# Patient Record
Sex: MALE | Race: BLACK OR AFRICAN AMERICAN | Employment: OTHER | URBAN - METROPOLITAN AREA
[De-identification: names, ages, dates, MRNs, and addresses within clinical notes are randomized per-mention and may not be internally consistent; named-entity substitution may affect disease eponyms.]

---

## 2017-10-26 ENCOUNTER — HOSPITAL ENCOUNTER (EMERGENCY)
Facility: CLINIC | Age: 32
Discharge: HOME | End: 2017-10-26
Attending: EMERGENCY MEDICINE | Admitting: EMERGENCY MEDICINE
Payer: COMMERCIAL

## 2017-10-26 VITALS
BODY MASS INDEX: 25.48 KG/M2 | WEIGHT: 178 LBS | SYSTOLIC BLOOD PRESSURE: 133 MMHG | DIASTOLIC BLOOD PRESSURE: 93 MMHG | TEMPERATURE: 97.8 F | HEART RATE: 83 BPM | OXYGEN SATURATION: 100 % | HEIGHT: 70 IN

## 2017-10-26 DIAGNOSIS — R42 LIGHTHEADEDNESS: ICD-10-CM

## 2017-10-26 DIAGNOSIS — D75.1 POLYCYTHEMIA: ICD-10-CM

## 2017-10-26 LAB
ANION GAP SERPL CALCULATED.3IONS-SCNC: 6 MMOL/L (ref 3–14)
BASOPHILS # BLD AUTO: 0 10E9/L (ref 0–0.2)
BASOPHILS NFR BLD AUTO: 0 %
BUN SERPL-MCNC: 11 MG/DL (ref 7–30)
CALCIUM SERPL-MCNC: 9 MG/DL (ref 8.5–10.1)
CHLORIDE SERPL-SCNC: 104 MMOL/L (ref 94–109)
CO2 SERPL-SCNC: 29 MMOL/L (ref 20–32)
CREAT SERPL-MCNC: 1.06 MG/DL (ref 0.66–1.25)
DIFFERENTIAL METHOD BLD: ABNORMAL
EOSINOPHIL # BLD AUTO: 0 10E9/L (ref 0–0.7)
EOSINOPHIL NFR BLD AUTO: 0.4 %
ERYTHROCYTE [DISTWIDTH] IN BLOOD BY AUTOMATED COUNT: 11.7 % (ref 10–15)
GFR SERPL CREATININE-BSD FRML MDRD: 80 ML/MIN/1.7M2
GLUCOSE SERPL-MCNC: 92 MG/DL (ref 70–99)
HCT VFR BLD AUTO: 48.4 % (ref 40–53)
HGB BLD-MCNC: 18.2 G/DL (ref 13.3–17.7)
IMM GRANULOCYTES # BLD: 0 10E9/L (ref 0–0.4)
IMM GRANULOCYTES NFR BLD: 0.4 %
INTERPRETATION ECG - MUSE: NORMAL
LYMPHOCYTES # BLD AUTO: 1.2 10E9/L (ref 0.8–5.3)
LYMPHOCYTES NFR BLD AUTO: 22.5 %
MCH RBC QN AUTO: 32.9 PG (ref 26.5–33)
MCHC RBC AUTO-ENTMCNC: 37.6 G/DL (ref 31.5–36.5)
MCV RBC AUTO: 87 FL (ref 78–100)
MONOCYTES # BLD AUTO: 0.4 10E9/L (ref 0–1.3)
MONOCYTES NFR BLD AUTO: 7.3 %
NEUTROPHILS # BLD AUTO: 3.7 10E9/L (ref 1.6–8.3)
NEUTROPHILS NFR BLD AUTO: 69.4 %
NRBC # BLD AUTO: 0 10*3/UL
NRBC BLD AUTO-RTO: 0 /100
PLATELET # BLD AUTO: 200 10E9/L (ref 150–450)
POTASSIUM SERPL-SCNC: 4.1 MMOL/L (ref 3.4–5.3)
RBC # BLD AUTO: 5.54 10E12/L (ref 4.4–5.9)
SODIUM SERPL-SCNC: 139 MMOL/L (ref 133–144)
TROPONIN I SERPL-MCNC: <0.015 UG/L (ref 0–0.04)
WBC # BLD AUTO: 5.3 10E9/L (ref 4–11)

## 2017-10-26 PROCEDURE — 85025 COMPLETE CBC W/AUTO DIFF WBC: CPT | Performed by: EMERGENCY MEDICINE

## 2017-10-26 PROCEDURE — 99284 EMERGENCY DEPT VISIT MOD MDM: CPT

## 2017-10-26 PROCEDURE — 80048 BASIC METABOLIC PNL TOTAL CA: CPT | Performed by: EMERGENCY MEDICINE

## 2017-10-26 PROCEDURE — 93005 ELECTROCARDIOGRAM TRACING: CPT

## 2017-10-26 PROCEDURE — 84484 ASSAY OF TROPONIN QUANT: CPT | Performed by: EMERGENCY MEDICINE

## 2017-10-26 NOTE — ED PROVIDER NOTES
"  History     Chief Complaint:  Dizziness       HPI   Jl Isaac is a 32 year old male who presents to the emergency department today for evaluation of lightheadedness. The patient reports he was walking at the [employer] last night and all of the lights there made him dizzy and lightheaded while walking. He states this sensation of lightheadedness has persisted and is still present today. The patient also reports an odd sensation in his chest with this for the past week, with increased belching. Of note, the patient's wife is a family medicine doctor who told the patient his symptoms were not worrisome. He denies any pain or fevers.  No headache, no lateralizing weakness or numbness, no history of stroke or heart problems. No history of DVT or PE.      Of note, the patient states he has a one month old at home and that he has been only sleeping around 4 hours a night lately.       Allergies:  No Known Drug Allergies      Medications:    The patient is currently on no regular medications.     Past Medical History:    History reviewed. No pertinent past medical history.    Past Surgical History:    History reviewed. No pertinent surgical history.    Family History:    History reviewed. No pertinent family history.      Social History:  The patient was accompanied to the ED by himself.  Smoking Status: Never smoker   Smokeless Tobacco: No   Alcohol Use: No    Marital Status:  , wife is family medicine MD   Trained as a , currently not working.    Review of Systems   All other systems reviewed and are negative.    Physical Exam     Patient Vitals for the past 24 hrs:   BP Temp Temp src Pulse Heart Rate Resp SpO2 Height Weight   10/26/17 1554 (!) 133/93 - - - 70 (!) 0 100 % - -   10/26/17 1530 - - - - 68 11 - - -   10/26/17 1357 123/78 97.8  F (36.6  C) Temporal 83 - 18 100 % 1.778 m (5' 10\") 80.7 kg (178 lb)      Physical Exam  General: nontoxic appearing male sitting upright  HENT: " mucous membranes moist, OP clear, TMs wnl  CV: regular rate, regular rhythm, no murmur audible, no LE edema  Resp: clear throughout, normal effort  GI: abdomen soft and nontender, no guarding  MSK: no bony tenderness  Skin: appropriately warm and dry  Neuro: awake, alert, clear speech, fully oriented, face symmetric, CN 2-12 intact,  normal, strength and sensation intact in all extr, no meningismus, ambulatory without ataxia, NIH SS = 0      Psych: calm, cooperative    Emergency Department Course     ECG:  Indication: Lightheadedness   Completed at 1436.  Read at 1444.   Normal sinus rhythm. Nonspecific ST and T wave abnormality.   Rate 76 bpm. OH interval 142. QRS duration 82. QT/QTc 374/420. P-R-T axes 26 46 -2.     Laboratory:  Laboratory findings were communicated with the patient who voiced understanding of the findings.    CBC: WBC 5.3, HGB 18.2 (H),   BMP: WNL. (Creatinine 1.06)   Troponin (Collected 1435): <0.015     Emergency Department Course:  Nursing notes and vitals reviewed.  1430 I performed an exam of the patient as documented above.   1446 Patient's orthostatics reviewed. Supine /85 and HR 75, sitting .103 and HR 83, standing /92 and HR 75.   IV was inserted and blood was drawn for laboratory testing, results above.   1554 I rechecked the patient and updated him on his laboratory and EKG results.   I discussed the treatment plan with the patient. They expressed understanding of this plan and consented to discharge. They will be discharged home with instructions for care and follow up. In addition, the patient will return to the emergency department if their symptoms worsen, if new symptoms arise or if there is any concern.  All questions were answered. I personally reviewed the laboratory results with the Patient and answered all related questions prior to discharge.   Impression & Plan      Medical Decision Making:  Jl Isaac is a 32 year old male who  presents of lightheadedness of unclear cause. He has normal vitals. Orthostasis considered but not supported by his evaluation. He has no lateralizing symptoms to suggest stroke. No vertigo. He walks with a steady gait, observed here. EKG shows no arrhythmia. Electrolytes are reassuring, ruling out hyponatremia or hyper or hypoglycemia. I did discus his minimally elevated hemoglobin with him, which I think is unlikely related to his presenting symptoms. With his multiple days of chest discomfort and a normal cardiopulmonary exam, his single troponin adequately rules out ACS in this young generally healthy male. I highly doubt PE given he is PERC negative, as well as aortic dissection, pneumothorax, or pneumonia. He was welcomed back for acute problems and otherwise can follow up soon through primary care.    Diagnosis:    ICD-10-CM    1. Lightheadedness R42    2. Polycythemia D75.1        Disposition:  Discharged to home.     Scribe Disclosure:  I, Shay Rai, am serving as a scribe at 2:25 PM on 10/26/2017 to document services personally performed by Robert Sierra MD based on my observations and the provider's statements to me.    10/26/2017    EMERGENCY DEPARTMENT       Robert Sierra MD  10/26/17 4222

## 2017-10-26 NOTE — ED AVS SNAPSHOT
Emergency Department    [address] [place] [address]    Phone:  [phone]    Fax:  [phone]                                       Jl Isaac   MRN: [id number]    Department:   Emergency Department   Date of Visit:  10/26/2017           After Visit Summary Signature Page     I have received my discharge instructions, and my questions have been answered. I have discussed any challenges I see with this plan with the nurse or doctor.    ..........................................................................................................................................  Patient/Patient Representative Signature      ..........................................................................................................................................  Patient Representative Print Name and Relationship to Patient    ..................................................               ................................................  Date                                            Time    ..........................................................................................................................................  Reviewed by Signature/Title    ...................................................              ..............................................  Date                                                            Time

## 2017-10-26 NOTE — ED AVS SNAPSHOT
Emergency Department    [address] [facility] [address]    Phone:  [phone]    Fax:  [phone]                                       Jl Isaac   MRN: [id number]    Department:   Emergency Department   Date of Visit:  10/26/2017           Patient Information     Date Of Birth          1985        Your diagnoses for this visit were:     Lightheadedness     Polycythemia        You were seen by Robert Sierra MD.      Follow-up Information     Schedule an appointment as soon as possible for a visit with [facility].    Specialties:  Podiatry, Internal Medicine, Family Medicine    Contact information:    [address] [address] [phone]        Follow up with  Emergency Department.    Specialty:  EMERGENCY MEDICINE    Why:  As needed, If symptoms worsen    Contact information:    [address] [facility] [address] [phone]      Discharge References/Attachments     DIZZINESS, UNCERTAIN CAUSE (ENGLISH)      24 Hour Appointment Hotline       To make an appointment at any [facility], call [phone]). If you don't have a family doctor or clinic, we will help you find one. [facility] are conveniently located to serve the needs of you and your family.             Review of your medicines      Notice     You have not been prescribed any medications.            Procedures and tests performed during your visit     Basic metabolic panel    CBC with platelets differential    Cardiac Continuous Monitoring    EKG 12-lead, tracing only    Orthostatic blood pressure and pulse    Peripheral IV catheter    Troponin I      Orders Needing Specimen Collection     None      Pending Results     No orders found from 10/24/2017 to 10/27/2017.            Pending Culture Results     No orders found from 10/24/2017 to 10/27/2017.            Pending Results Instructions     If you had any  lab results that were not finalized at the time of your Discharge, you can call the ED Lab Result RN at [phone]. You will be contacted by this team for any positive Lab results or changes in treatment. The nurses are available 7 days a week from 10A to 6:30P.  You can leave a message 24 hours per day and they will return your call.        Test Results From Your Hospital Stay        10/26/2017  3:05 PM      Component Results     Component Value Ref Range & Units Status    Sodium 139 133 - 144 mmol/L Final    Potassium 4.1 3.4 - 5.3 mmol/L Final    Chloride 104 94 - 109 mmol/L Final    Carbon Dioxide 29 20 - 32 mmol/L Final    Anion Gap 6 3 - 14 mmol/L Final    Glucose 92 70 - 99 mg/dL Final    Urea Nitrogen 11 7 - 30 mg/dL Final    Creatinine 1.06 0.66 - 1.25 mg/dL Final    GFR Estimate 80 >60 mL/min/1.7m2 Final    Non  GFR Calc    GFR Estimate If Black >90 >60 mL/min/1.7m2 Final    African American GFR Calc    Calcium 9.0 8.5 - 10.1 mg/dL Final         10/26/2017  3:27 PM      Component Results     Component Value Ref Range & Units Status    WBC 5.3 4.0 - 11.0 10e9/L Final    RBC Count 5.54 4.4 - 5.9 10e12/L Final    Hemoglobin 18.2 (H) 13.3 - 17.7 g/dL Final    Hematocrit 48.4 40.0 - 53.0 % Final    MCV 87 78 - 100 fl Final    MCH 32.9 26.5 - 33.0 pg Final    MCHC 37.6 (H) 31.5 - 36.5 g/dL Final    37 C results, possible cold agglutinins    RDW 11.7 10.0 - 15.0 % Final    Platelet Count 200 150 - 450 10e9/L Final    Diff Method Automated Method  Final    % Neutrophils 69.4 % Final    % Lymphocytes 22.5 % Final    % Monocytes 7.3 % Final    % Eosinophils 0.4 % Final    % Basophils 0.0 % Final    % Immature Granulocytes 0.4 % Final    Nucleated RBCs 0 0 /100 Final    Absolute Neutrophil 3.7 1.6 - 8.3 10e9/L Final    Absolute Lymphocytes 1.2 0.8 - 5.3 10e9/L Final    Absolute Monocytes 0.4 0.0 - 1.3 10e9/L Final    Absolute Eosinophils 0.0 0.0 - 0.7 10e9/L Final    Absolute Basophils 0.0 0.0 - 0.2  10e9/L Final    Abs Immature Granulocytes 0.0 0 - 0.4 10e9/L Final    Absolute Nucleated RBC 0.0  Final         10/26/2017  3:07 PM      Component Results     Component Value Ref Range & Units Status    Troponin I ES <0.015 0.000 - 0.045 ug/L Final    The 99th percentile for upper reference range is 0.045 ug/L.  Troponin values   in the range of 0.045 - 0.120 ug/L may be associated with risks of adverse   clinical events.                  Clinical Quality Measure: Blood Pressure Screening     Your blood pressure was checked while you were in the emergency department today. The last reading we obtained was  BP: 123/78 . Please read the guidelines below about what these numbers mean and what you should do about them.  If your systolic blood pressure (the top number) is less than 120 and your diastolic blood pressure (the bottom number) is less than 80, then your blood pressure is normal. There is nothing more that you need to do about it.  If your systolic blood pressure (the top number) is 120-139 or your diastolic blood pressure (the bottom number) is 80-89, your blood pressure may be higher than it should be. You should have your blood pressure rechecked within a year by a primary care provider.  If your systolic blood pressure (the top number) is 140 or greater or your diastolic blood pressure (the bottom number) is 90 or greater, you may have high blood pressure. High blood pressure is treatable, but if left untreated over time it can put you at risk for heart attack, stroke, or kidney failure. You should have your blood pressure rechecked by a primary care provider within the next 4 weeks.  If your provider in the emergency department today gave you specific instructions to follow-up with your doctor or provider even sooner than that, you should follow that instruction and not wait for up to 4 weeks for your follow-up visit.        Thank you for choosing [facility]       Thank you for choosing [facility] for your  "care. Our goal is always to provide you with excellent care. Hearing back from our patients is one way we can continue to improve our services. Please take a few minutes to complete the written survey that you may receive in the mail after you visit with us. Thank you!        [employer] Information     [employer] lets you send messages to your doctor, view your test results, renew your prescriptions, schedule appointments and more. To sign up, go to [email or web address] . Click on \"Log in\" on the left side of the screen, which will take you to the Welcome page. Then click on \"Sign up Now\" on the right side of the page.     You will be asked to enter the access code listed below, as well as some personal information. Please follow the directions to create your username and password.     Your access code is: [email or web address]  Expires: 2018  4:22 PM     Your access code will  in 90 days. If you need help or a new code, please call your [facility] clinic or [phone].        Care EveryWhere ID     This is your Care EveryWhere ID. This could be used by other organizations to access your [facility] medical records  [id number]        Equal Access to Services     [facility]: Issa Anderson, ellen bailon, qagreg kawilly vallejo, cynthia pradhan. So [facility] [phone].    ATENCIÓN: Si habla español, tiene a jackson disposición servicios gratuitos de asistencia lingüística. Alame al [phone].    We comply with applicable federal civil rights laws and [place] laws. We do not discriminate on the basis of race, color, national origin, age, disability, sex, sexual orientation, or gender identity.            After Visit Summary       This is your record. Keep this with you and show to your community pharmacist(s) and doctor(s) at your next visit.                  "

## 2017-11-25 ENCOUNTER — HOSPITAL ENCOUNTER (EMERGENCY)
Facility: CLINIC | Age: 32
Discharge: HOME | End: 2017-11-25
Attending: EMERGENCY MEDICINE | Admitting: EMERGENCY MEDICINE
Payer: COMMERCIAL

## 2017-11-25 VITALS
DIASTOLIC BLOOD PRESSURE: 92 MMHG | WEIGHT: 180 LBS | HEIGHT: 70 IN | RESPIRATION RATE: 18 BRPM | TEMPERATURE: 97 F | SYSTOLIC BLOOD PRESSURE: 129 MMHG | BODY MASS INDEX: 25.77 KG/M2 | OXYGEN SATURATION: 100 %

## 2017-11-25 DIAGNOSIS — R00.0 SINUS TACHYCARDIA: ICD-10-CM

## 2017-11-25 DIAGNOSIS — F17.290 HOOKAH PIPE SMOKER: ICD-10-CM

## 2017-11-25 PROCEDURE — 99283 EMERGENCY DEPT VISIT LOW MDM: CPT

## 2017-11-25 PROCEDURE — 93005 ELECTROCARDIOGRAM TRACING: CPT

## 2017-11-25 ASSESSMENT — ENCOUNTER SYMPTOMS
CHILLS: 0
FEVER: 0
APPETITE CHANGE: 0
PALPITATIONS: 1
COUGH: 0
NUMBNESS: 0
NERVOUS/ANXIOUS: 1

## 2017-11-25 NOTE — ED AVS SNAPSHOT
Emergency Department    [address] [place] [address]    Phone:  [phone]    Fax:  [phone]                                       Jl Isaac   MRN: [id number]    Department:   Emergency Department   Date of Visit:  11/25/2017           After Visit Summary Signature Page     I have received my discharge instructions, and my questions have been answered. I have discussed any challenges I see with this plan with the nurse or doctor.    ..........................................................................................................................................  Patient/Patient Representative Signature      ..........................................................................................................................................  Patient Representative Print Name and Relationship to Patient    ..................................................               ................................................  Date                                            Time    ..........................................................................................................................................  Reviewed by Signature/Title    ...................................................              ..............................................  Date                                                            Time

## 2017-11-25 NOTE — ED AVS SNAPSHOT
Emergency Department    [address] [place] [address]    Phone:  [phone]    Fax:  [phone]                                       Jl Isaac   MRN: [id number]    Department:   Emergency Department   Date of Visit:  11/25/2017           Patient Information     Date Of Birth          1985        Your diagnoses for this visit were:     Hookah pipe smoker     Sinus tachycardia secondary to Hookah and caffeine       You were seen by Katerin Ramirez MD.      Follow-up Information     Schedule an appointment as soon as possible for a visit with No Ref-Primary, Physician.    Why:  As needed    Contact information:    NO REF-PRIMARY PHYSICIAN          Discharge Instructions       No more smoking hookah, push fluids, caffeine in moderation. Recheck in the clinic if you have any further problems.        Discharge References/Attachments     CANCER, PREVENTING (ENGLISH)      24 Hour Appointment Hotline       To make an appointment at any [facility], call [phone]). If you don't have a family doctor or clinic, we will help you find one. [facility] are conveniently located to serve the needs of you and your family.             Review of your medicines      Notice     You have not been prescribed any medications.            Procedures and tests performed during your visit     EKG 12 lead      Orders Needing Specimen Collection     None      Pending Results     No orders found from 11/23/2017 to 11/26/2017.            Pending Culture Results     No orders found from 11/23/2017 to 11/26/2017.            Pending Results Instructions     If you had any lab results that were not finalized at the time of your Discharge, you can call the ED Lab Result RN at [phone]. You will be contacted by this team for any positive Lab results or changes in treatment. The nurses are available 7 days a week from 10A to 6:30P.  You can leave a message 24 hours per day  and they will return your call.        Test Results From Your Hospital Stay               Clinical Quality Measure: Blood Pressure Screening     Your blood pressure was checked while you were in the emergency department today. The last reading we obtained was  BP: (!) 129/92 . Please read the guidelines below about what these numbers mean and what you should do about them.  If your systolic blood pressure (the top number) is less than 120 and your diastolic blood pressure (the bottom number) is less than 80, then your blood pressure is normal. There is nothing more that you need to do about it.  If your systolic blood pressure (the top number) is 120-139 or your diastolic blood pressure (the bottom number) is 80-89, your blood pressure may be higher than it should be. You should have your blood pressure rechecked within a year by a primary care provider.  If your systolic blood pressure (the top number) is 140 or greater or your diastolic blood pressure (the bottom number) is 90 or greater, you may have high blood pressure. High blood pressure is treatable, but if left untreated over time it can put you at risk for heart attack, stroke, or kidney failure. You should have your blood pressure rechecked by a primary care provider within the next 4 weeks.  If your provider in the emergency department today gave you specific instructions to follow-up with your doctor or provider even sooner than that, you should follow that instruction and not wait for up to 4 weeks for your follow-up visit.        Thank you for choosing [facility]       Thank you for choosing [facility] for your care. Our goal is always to provide you with excellent care. Hearing back from our patients is one way we can continue to improve our services. Please take a few minutes to complete the written survey that you may receive in the mail after you visit with us. Thank you!        [employer] Information     [employer] lets you send messages to your doctor,  "view your test results, renew your prescriptions, schedule appointments and more. To sign up, go to [email or web address] . Click on \"Log in\" on the left side of the screen, which will take you to the Welcome page. Then click on \"Sign up Now\" on the right side of the page.     You will be asked to enter the access code listed below, as well as some personal information. Please follow the directions to create your username and password.     Your access code is: [email or web address]  Expires: 2018  3:22 PM     Your access code will  in 90 days. If you need help or a new code, please call your [facility] clinic or [phone].        Care EveryWhere ID     This is your Care EveryWhere ID. This could be used by other organizations to access your [facility] medical records  [id number]        Equal Access to Services     [facility] : Hadsergei Anderson, warodney bailon, mayra vallejo, cynthia garza . So [facility] [phone].    ATENCIÓN: Si habla español, tiene a jackson disposición servicios gratuitos de asistencia lingüística. Llame al [phone].    We comply with applicable federal civil rights laws and [place] laws. We do not discriminate on the basis of race, color, national origin, age, disability, sex, sexual orientation, or gender identity.            After Visit Summary       This is your record. Keep this with you and show to your community pharmacist(s) and doctor(s) at your next visit.                  "

## 2017-11-25 NOTE — ED PROVIDER NOTES
History     Chief Complaint:  Palpitations       HPI   Jl Isaac is a 32 year old male who presents to the ED for evaluation of palpitations. The patient reports he was watching a war movie, drinking a coke, and smoking Hookah. The patient notes his palpitations began which then caused anxiety and subsequent increased palpitations. The patient reports he is quitting Hookah after today. The patient denies any fevers, chills, cough, chest pain, leg swelling, numbness, tingling, or appetite change.     CARDIAC RISK FACTORS:  Sex:    Male  Tobacco:   No  Hypertension:   No  Hyperlipidemia:  No  Diabetes:   No  Family History:  No    PE/DVT RISK FACTORS:  Sex:    Male  Hormones:   No  Tobacco:   No  Cancer:   No  Travel:   No  Surgery:   No  Other immobilization: No  Personal history:  No  Family history:  No    Allergies:  No known drug allergies    Medications:    The patient is not currently taking any prescribed medications.    Past Medical History:    The patient does not have any past pertinent medical history.    Past Surgical History:    History reviewed. No pertinent surgical history.    Family History:    History reviewed. No pertinent family history.     Social History:  Smoking status: Never smoker  Substance use: Hookah   Alcohol use: No  Presents to ED alone  Marital Status:   [2]     Review of Systems   Constitutional: Negative for appetite change, chills and fever.   Respiratory: Negative for cough.    Cardiovascular: Positive for palpitations. Negative for chest pain and leg swelling.   Neurological: Negative for numbness.   Psychiatric/Behavioral: The patient is nervous/anxious.    All other systems reviewed and are negative.    Physical Exam     Patient Vitals for the past 24 hrs:   BP Temp Temp src Heart Rate Resp SpO2 Height Weight   11/25/17 1745 - - - 72 18 - - -   11/25/17 1730 - - - 73 22 - - -   11/25/17 1715 - - - 86 19 - - -   11/25/17 1700 - - - 90 14 - - -   11/25/17 1636  "(!) 129/92 97  F (36.1  C) Oral 106 16 100 % 1.778 m (5' 10\") 81.6 kg (180 lb)     Physical Exam  Nursing note and vitals reviewed.  Constitutional:  Appears well-developed and well-nourished, comfortable.   HENT:   Head:   No evidence of facial or scalp trauma.  Nose:    Nose normal.   Mouth/Throat:  Mucosa is moist.  Eyes:    Conjunctivae are normal.      Pupils are equal, round, and reactive to light.      Right eye exhibits no discharge. Left eye exhibits no discharge.      No scleral icterus.   Cardiovascular:  Mild regular tachycardia.      Normal heart sounds and intact distal pulses.       No murmur heard.  Pulmonary/Chest:  Effort normal and breath sounds normal. No respiratory distress.     No wheezes. No rales. No chest wall tenderness. No stridor.   Abdominal:   Soft. No distension and no mass. No tenderness.      No rebound and no guarding. No flank pain.  Musculoskeletal:  Normal range of motion.      No edema and no tenderness.                                       Neck supple, no midline cervical tenderness.   Neurological:   Alert and oriented to person, place, and year.      No cranial nerve deficit.      Exhibits normal muscle tone. Coordination normal.      GCS eye subscore is 4. GCS verbal subscore is 5.      GCS motor subscore is 6.   Skin:    Skin is warm and dry. No rash noted. No diaphoresis.      No erythema. No pallor.   Psychiatric:   Anxious.     Emergency Department Course     ECG (16:34:39):  Rate 107 bpm. MT interval 148. QRS duration 74. QT/QTc 328/437. P-R-T axes 61 56 15. Sinus tachycardia. Possible left atrial enlargement. Borderline ECG. Interpreted at 1655 by Katerin Ramirez MD.    Emergency Department Course:  Past medical records, nursing notes, and vitals reviewed.  1651: I performed an exam of the patient and obtained history, as documented above.    1707: I had a discussion with the patient about the risks of Hookah and need for quitting.     Findings and plan explained " to the Patient. Patient discharged home with instructions regarding supportive care, medications, and reasons to return. The importance of close follow-up was reviewed.     Impression & Plan      Medical Decision Making:  Patient comes in with sinus tachycardia. He admitted to drinking a can of Coke and then smoking hookah which she has been doing off and on. He remembers having tachycardia in the past and anxiety from smoking hookah. He said that he wants to stop and I strongly encouraged this. Hookah also carries a risk for cancer and often causes anxiety and tachycardia. His observed for a period of time in the emergency room given water to drink, and his pulse rate now is normal. He has no risk factors for PE, he has no chest pain. Feel he needs further workup. His EKG is normal other than showing sinus tachycardia.    Diagnosis:    ICD-10-CM    1. 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 Sinus tachycardia R00.0     secondary to Hookah and caffeine     Disposition: Patient discharged to home. No more smoking hookah, push fluids, caffeine in moderation. Recheck in the clinic if you have any further problems.    Rebeca Carrero  11/25/2017    EMERGENCY DEPARTMENT    I, Rebeca Carrero, am serving as a scribe at 4:51 PM on 11/25/2017 to document services personally performed by Katerin Ramirez MD based on my observations and the provider's statements to me.        Katerin Ramirez MD  11/25/17 2546

## 2017-11-26 LAB — INTERPRETATION ECG - MUSE: NORMAL

## 2017-11-26 NOTE — DISCHARGE INSTRUCTIONS
No more smoking hookah, push fluids, caffeine in moderation. Recheck in the clinic if you have any further problems.

## 2019-02-05 ENCOUNTER — ANCILLARY PROCEDURE (OUTPATIENT)
Dept: GENERAL RADIOLOGY | Facility: CLINIC | Age: 34
End: 2019-02-05
Attending: FAMILY MEDICINE
Payer: COMMERCIAL

## 2019-02-05 ENCOUNTER — OFFICE VISIT (OUTPATIENT)
Dept: ORTHOPEDICS | Facility: CLINIC | Age: 34
End: 2019-02-05
Payer: COMMERCIAL

## 2019-02-05 VITALS — DIASTOLIC BLOOD PRESSURE: 80 MMHG | SYSTOLIC BLOOD PRESSURE: 108 MMHG

## 2019-02-05 DIAGNOSIS — M25.531 WRIST PAIN, RIGHT: ICD-10-CM

## 2019-02-05 DIAGNOSIS — S62.024A CLOSED NONDISPLACED FRACTURE OF MIDDLE THIRD OF SCAPHOID BONE OF RIGHT WRIST, INITIAL ENCOUNTER: Primary | ICD-10-CM

## 2019-02-05 PROBLEM — S62.014A: Status: ACTIVE | Noted: 2019-02-05

## 2019-02-05 PROCEDURE — 99204 OFFICE O/P NEW MOD 45 MIN: CPT | Mod: 57 | Performed by: FAMILY MEDICINE

## 2019-02-05 PROCEDURE — 73110 X-RAY EXAM OF WRIST: CPT | Mod: RT | Performed by: FAMILY MEDICINE

## 2019-02-05 PROCEDURE — 25622 CLTX CARPL SCPHD FX W/O MNPJ: CPT | Mod: RT | Performed by: FAMILY MEDICINE

## 2019-02-05 RX ORDER — TRAMADOL HYDROCHLORIDE 50 MG/1
50 TABLET ORAL EVERY 8 HOURS PRN
Qty: 20 TABLET | Refills: 0 | Status: SHIPPED | OUTPATIENT
Start: 2019-02-05 | End: 2019-07-16

## 2019-02-05 NOTE — PROGRESS NOTES
[facility]   Clinic Visit s Feb 5, 2019    PCP: No Ref-Primary, Physician      Jl is a 34 year old male who is seen as self referral for   Chief Complaint   Patient presents with     Right Wrist - Pain       Injury: Patient describes injury as direct blow to flexed wrist from a soccer ball      Right hand dominant    Location of Pain: right wrist radial and ulnar, nonradiating   Duration of Pain: 1 day(s)  Rating of Pain at worst: 7/10  Rating of Pain Currently: 7/10  Pain is better with: activity avoidance   Pain is worse with: carrying (trash, groceries, laundry), cooking and self care  Treatment so far consists of: brace  Associated symptoms: swelling Moderate  Recent imaging completed: No recent imaging completed.  Prior History of related problems: none    Social History: is employed as a/an sales      Past Medical History:   Diagnosis Date     NO ACTIVE PROBLEMS        There are no active problems to display for this patient.      History reviewed. No pertinent family history.    Social History     Socioeconomic History     Marital status:      Spouse name: Not on file     Number of children: Not on file     Years of education: Not on file     Highest education level: Not on file   Social Needs     Financial resource strain: Not on file     Food insecurity - worry: Not on file     Food insecurity - inability: Not on file     Transportation needs - medical: Not on file     Transportation needs - non-medical: Not on file   Occupational History     Not on file   Tobacco Use     Smoking status: Never Smoker   Substance and Sexual Activity     Alcohol use: No     Drug use: No     Sexual activity: Not on file   Other Topics Concern     Not on file   Social History Narrative     Not on file       Past Surgical History:   Procedure Laterality Date     NO HISTORY OF SURGERY             Review of Systems   Musculoskeletal: Positive for joint pain.   All other systems reviewed  and are negative.        Physical Exam  /80   Constitutional:well-developed, well-nourished, and in no distress.   Cardiovascular: Intact distal pulses.    Neurological: alert. Gait Normal:   Gait, station, stance, and balance appear normal for age  Skin: Skin is warm and dry.   Psychiatric: Mood and affect normal.   Respiratory: unlabored, speaks in full sentences  Lymph: no LAD, no lymphangitis            Left Hand Exam     Tenderness   The patient is experiencing tenderness in the snuff box.     Range of Motion   Wrist   Extension: 5   Flexion: 0   Pronation: 0   Supination: 0     Other   Erythema: absent  Scars: absent  Sensation: normal  Pulse: present    Comments:  Prominent swelling about the dorsal radial wrist, no bruising.  No bony deformity apparent.          X-ray images Ordered and independently reviewed by me in the office today with the patient. X-ray shows:     Recent Results (from the past 744 hour(s))   XR Wrist Right G/E 3 Views    Narrative    2/5/2019    Nondisplaced mid waist lucency of scaphoid highly suspicious for fracture.    Remainder of wrist is normal, without other fractures or soft tissue   deformities.         ASSESSMENT/PLAN    ICD-10-CM    1. Closed nondisplaced fracture of middle third of scaphoid bone of right wrist, initial encounter S62.024A traMADol (ULTRAM) 50 MG tablet     Cast/splint application   2. Wrist pain, right M25.531 XR Wrist Right G/E 3 Views     Scaphoid fracture, nondisplaced.  Will start with thumb spica splint and replaced with hard cast after repeat x-ray in a few days.  Discussed options including continued conservative management versus surgical consultation.  At this point we will continue with casting, but if further displacement were to develop, he would need surgical consultation.  Patient comfortable with the plan.  Okay for one-time prescription of tramadol for acute pain.    Cast/splint application  Date/Time: 2/5/2019 3:47 PM  Performed by:  Erickson Mirza MD  Authorized by: Erickson Mirza MD     Consent:     Consent obtained:  Verbal    Consent given by:  Patient    Alternatives discussed:  No treatment  Universal protocol:     Patient identity confirmed:  Verbally with patient  Pre-procedure details:     Sensation:  Normal  Procedure details:     Laterality:  Right    Location:  Wrist    Wrist:  R wrist    Splint type:  Radial gutter    Supplies:  Fiberglass  Post-procedure details:     Sensation:  Normal    Patient tolerance of procedure:  Tolerated well, no immediate complications    Patient provided with cast or splint care instructions: Yes

## 2019-02-05 NOTE — PATIENT INSTRUCTIONS
Patient Education       Navicular Wrist Fracture, Confirmed  You have a break (fracture) in one of the small bones of your wrist. This bone heals slowly. You may need to be in a cast for up to 3 months. Some navicular fractures don t heal the way they should. If so, you may need surgery at a later time.    Home care  Follow these guidelines when caring for yourself at home:    Keep your hand elevated to reduce pain and swelling. When sitting or lying down keep your arm above the level of your heart. You can do this by placing your arm on a pillow that rests on your chest or on a pillow at your side. This is most important during the first 2 days (48 hours) after the injury.    Put an ice pack on the injured area. Do this for 20 minutes every 1 to 2 hours the first day for pain relief. You can make an ice pack by wrapping a plastic bag of ice cubes in a thin towel. As the ice melts, be careful that the cast or splint doesn t get wet. Continue using the ice pack 3 to 4 times a day for the next 2 days. Then use the ice pack as needed to ease pain and swelling.    Keep the cast or splint completely dry at all times. Bathe with your cast or splint out of the water. Protect it with a large plastic bag, rubber-banded at the top end. If a fiberglass cast or splint gets wet, you can dry it with a hair dryer on the cool setting.    You may use acetaminophen or ibuprofen to control pain, unless another pain medicine was prescribed. If you have chronic liver or kidney disease, talk with your healthcare provider before using these medicines. Also talk with your provider if you ve had a stomach ulcer or gastrointestinal bleeding.    If you smoke, try to quit. Tobacco use can keep this fracture from healing the way it should. Smoking raises the risk that you will need surgery for this fracture.  Follow-up care  Follow up with your healthcare provider, or as advised. This is to make sure the bone is healing the way it should. If  a splint was put on, it may be changed to a cast during your follow-up visit. When the cast is removed, you will need to do special hand and wrist exercises. These will help you get back your wrist strength and range of motion. Some people have permanent stiffness in the wrist after this type of injury.  If X-rays were taken, a radiologist may look at them. You will be told of any new findings that may affect your care.  When to seek medical advice  Call your healthcare provider right away if any of these occur:    The cast or splint cracks    The plaster cast or splint becomes wet or soft    The fiberglass cast or splint stays wet for more than 24 hours    Tightness or pain under the cast or splint gets worse    Fingers become swollen, cold, blue, numb, or tingly    Fingers are hard to move    Bad odor from the cast or splint or wound fluid stains the cast or splint    The skin around the cast or splint becomes red, swollen, or irritated    Fever of 101 F (38.3 C) or higher, or as directed by your healthcare provider  Date Last Reviewed: 5/1/2017 2000-2018 [employer]. [address] [place] [address]. All rights reserved. This information is not intended as a substitute for professional medical care. Always follow your healthcare professional's instructions.           Patient Education     Fiberglass Cast Care    It may take up to 2 hours for the fiberglass to get completely hard. Don t put any weight on the cast during that time or it may break.  To prevent swelling under the cast, do the following for the first 2 days (48 hours):    If the cast is on your arm: Keep it in a sling or raised to shoulder level when you are sitting or standing. Rest it on your chest or on a pillow at your side when lying down. Keep the cast above the level of your heart.    If the cast is on your leg: Keep it propped up above the level of your hip when you are sitting or lying down. Sleep with the cast  raised on a pillow. Avoid crutch walking as much as possible during this time.  Keep the cast completely dry at all times. Bathe with your cast well out of the water. Protect it with a large plastic bag kept in place with rubber bands. If your cast does get wet, use a hair dryer to warm the cast and speed up the drying process. A wet cast may cause skin problems.  Don t put creams or objects under the cast if you have itching.  Follow-up care  Follow up with your healthcare provider, or as advised.  When to seek medical advice  Call your healthcare provider right away if any of these occur:    The cast cracks    The cast and padding get wet and stay wet for more than a day (24 hours)    Bad odor from the cast or wound fluid stains the cast    Tightness or pressure under the cast gets worse    Fingers or toes become swollen, cold, blue, numb, or tingly    You can t move your toes or fingers    Pain under the cast gets worse or you feel a burning sensation    Skin around the cast becomes red    Fever of 100.4 F (38 C) or higher, or as directed by your healthcare provider   Date Last Reviewed: 2/1/2017 2000-2018 [employer]. [address] [place] [address], [place] [address]. All rights reserved. This information is not intended as a substitute for professional medical care. Always follow your healthcare professional's instructions.

## 2019-02-05 NOTE — LETTER
2/5/2019         RE: Jl Isaac  [address] [place] [facility] [address]        Dear Colleague,    Thank you for referring your patient, Jl Isaac, to the [facility]. Please see a copy of my visit note below.    HPI   [facility]   Clinic Visit s Feb 5, 2019    PCP: No Ref-Primary, Physician      Jl is a 34 year old male who is seen as self referral for   Chief Complaint   Patient presents with     Right Wrist - Pain       Injury: Patient describes injury as direct blow to flexed wrist from a soccer ball      Right hand dominant    Location of Pain: right wrist radial and ulnar, nonradiating   Duration of Pain: 1 day(s)  Rating of Pain at worst: 7/10  Rating of Pain Currently: 7/10  Pain is better with: activity avoidance   Pain is worse with: carrying (trash, groceries, laundry), cooking and self care  Treatment so far consists of: brace  Associated symptoms: swelling Moderate  Recent imaging completed: No recent imaging completed.  Prior History of related problems: none    Social History: is employed as a/an sales      Past Medical History:   Diagnosis Date     NO ACTIVE PROBLEMS        There are no active problems to display for this patient.      History reviewed. No pertinent family history.    Social History     Socioeconomic History     Marital status:      Spouse name: Not on file     Number of children: Not on file     Years of education: Not on file     Highest education level: Not on file   Social Needs     Financial resource strain: Not on file     Food insecurity - worry: Not on file     Food insecurity - inability: Not on file     Transportation needs - medical: Not on file     Transportation needs - non-medical: Not on file   Occupational History     Not on file   Tobacco Use     Smoking status: Never Smoker   Substance and Sexual Activity     Alcohol use: No     Drug use: No     Sexual activity: Not  on file   Other Topics Concern     Not on file   Social History Narrative     Not on file       Past Surgical History:   Procedure Laterality Date     NO HISTORY OF SURGERY             Review of Systems   Musculoskeletal: Positive for joint pain.   All other systems reviewed and are negative.        Physical Exam  /80   Constitutional:well-developed, well-nourished, and in no distress.   Cardiovascular: Intact distal pulses.    Neurological: alert. Gait Normal:   Gait, station, stance, and balance appear normal for age  Skin: Skin is warm and dry.   Psychiatric: Mood and affect normal.   Respiratory: unlabored, speaks in full sentences  Lymph: no LAD, no lymphangitis            Left Hand Exam     Tenderness   The patient is experiencing tenderness in the snuff box.     Range of Motion   Wrist   Extension: 5   Flexion: 0   Pronation: 0   Supination: 0     Other   Erythema: absent  Scars: absent  Sensation: normal  Pulse: present    Comments:  Prominent swelling about the dorsal radial wrist, no bruising.  No bony deformity apparent.          X-ray images Ordered and independently reviewed by me in the office today with the patient. X-ray shows:     Recent Results (from the past 744 hour(s))   XR Wrist Right G/E 3 Views    Narrative    2/5/2019    Nondisplaced mid waist lucency of scaphoid highly suspicious for fracture.    Remainder of wrist is normal, without other fractures or soft tissue   deformities.         ASSESSMENT/PLAN    ICD-10-CM    1. Closed nondisplaced fracture of middle third of scaphoid bone of right wrist, initial encounter S62.024A traMADol (ULTRAM) 50 MG tablet     Cast/splint application   2. Wrist pain, right M25.531 XR Wrist Right G/E 3 Views     Scaphoid fracture, nondisplaced.  Will start with thumb spica splint and replaced with hard cast after repeat x-ray in a few days.  Discussed options including continued conservative management versus surgical consultation.  At this point we will  continue with casting, but if further displacement were to develop, he would need surgical consultation.  Patient comfortable with the plan.  Okay for one-time prescription of tramadol for acute pain.    Cast/splint application  Date/Time: 2/5/2019 3:47 PM  Performed by: Erickson Mirza MD  Authorized by: Erickson Mirza MD     Consent:     Consent obtained:  Verbal    Consent given by:  Patient    Alternatives discussed:  No treatment  Universal protocol:     Patient identity confirmed:  Verbally with patient  Pre-procedure details:     Sensation:  Normal  Procedure details:     Laterality:  Right    Location:  Wrist    Wrist:  R wrist    Splint type:  Radial gutter    Supplies:  Fiberglass  Post-procedure details:     Sensation:  Normal    Patient tolerance of procedure:  Tolerated well, no immediate complications    Patient provided with cast or splint care instructions: Yes            Again, thank you for allowing me to participate in the care of your patient.        Sincerely,        Erickson Mirza MD

## 2019-02-06 NOTE — PROGRESS NOTES
ANISHA   [facility]   Follow-up Visit s Feb 8, 2019    PCP: No Ref-Primary, Physician      Subjective:  Jl is a 34 year old male who is seen in follow up for evaluation of   Chief Complaint   Patient presents with     Left Knee - Pain     Right Wrist - Pain     His last visit was on 2/5/2019.  Since that time, symptoms have been unchanged. Jl Isaac is accompanied today by self.     Patient has noticed worsened symptoms with splinting treatment.  Patient has marked swelling  Pain is located radial, wrist, constant and persistent.  Patient is using splint.      New left knee  Injury: Patient describes injury as soccer injury. He felt a pop in his knee and hyper extended his knee.      Location of Pain: left knee wrist radial and ulnar, nonradiating   Duration of Pain: 2 years(s)  Rating of Pain at worst: 2/10  Rating of Pain Currently: 0/10  Pain is better with: activity avoidance   Pain is worse with:sitting to standing, squat  Treatment so far consists of: physical therapy   Associated symptoms: atrophy of quad  Recent imaging completed: No recent imaging completed, xrays at [facility] 2017  Prior History of related problems: none    Patient's past medical, surgical, social and family histories are reviewed today.    doi wrist 2/4/19    Social History: is employed as a/an sales      Past Medical History:   Diagnosis Date     NO ACTIVE PROBLEMS        Patient Active Problem List    Diagnosis Date Noted     Closed nondisplaced fracture of middle third of scaphoid bone of right wrist, initial encounter 02/05/2019     Priority: Medium     FMHX denies sig illnesses.      Social History     Socioeconomic History     Marital status:      Spouse name: Not on file     Number of children: Not on file     Years of education: Not on file     Highest education level: Not on file   Social Needs     Financial resource strain: Not on file     Food insecurity - worry: Not on file     Food  "insecurity - inability: Not on file     Transportation needs - medical: Not on file     Transportation needs - non-medical: Not on file   Occupational History     Not on file   Tobacco Use     Smoking status: Never Smoker   Substance and Sexual Activity     Alcohol use: No     Drug use: No       Past Surgical History:   Procedure Laterality Date     NO HISTORY OF SURGERY             Review of Systems   Musculoskeletal: Positive for joint pain.   All other systems reviewed and are negative.        Physical Exam  /80   Ht 1.803 m (5' 11\")   Wt 77.1 kg (170 lb)   BMI 23.71 kg/m    Constitutional:well-developed, well-nourished, and in no distress.   Cardiovascular: Intact distal pulses.    Neurological: alert. Gait Normal:   Gait, station, stance, and balance appear normal for age  Skin: Skin is warm and dry.   Psychiatric: Mood and affect normal.   Respiratory: unlabored, speaks in full sentences  Lymph: no LAD, no lymphangitis            Left Hand Exam     Tenderness   The patient is experiencing tenderness in the snuff box.     Range of Motion   Wrist   Extension: 20   Flexion: 20   Pronation:  0 normal   Supination: 0     Other   Erythema: absent  Scars: absent  Sensation: normal  Pulse: present    Comments:  Resolved swelling about the wrist.  Mild swelling of fingers.  Sensation intact, good circulation.          X-ray images Ordered and independently reviewed by me in the office today with the patient. X-ray shows:     Recent Results (from the past 744 hour(s))   XR Wrist Right G/E 3 Views    Narrative    2/5/2019    Nondisplaced mid waist lucency of scaphoid highly suspicious for fracture.    Remainder of wrist is normal, without other fractures or soft tissue   deformities.         ASSESSMENT/PLAN    ICD-10-CM    1. Quadriceps weakness M62.81 [facility]   2. Patellofemoral pain syndrome of left knee M22.2X2 [facility]   3. Closed nondisplaced " fracture of middle third of scaphoid of right wrist with routine healing, subsequent encounter S62.024D Cast/splint application     Converted to thumb spica short arm cast today, recheck with repeat x-rays in 3-4 weeks.    Left knee pain also evaluated today as above, and pain is most consistent with patellofemoral pain syndrome.  He has had a few therapy visits before but never a dedicated effort.  We discussed MRI but deferred for now.  Unless symptoms change, this is otherwise consistent with patellofemoral syndrome.  MRI would not be necessary.  Physical therapy orders placed.    Cast/splint application  Date/Time: 2/8/2019 3:05 PM  Performed by: Erickson Mirza MD  Authorized by: Erickson Mirza MD     Consent:     Consent obtained:  Verbal    Consent given by:  Patient  Pre-procedure details:     Sensation:  Normal  Procedure details:     Laterality:  Right    Location:  Wrist    Wrist:  R wrist    Cast type:  Thumb spica    Supplies:  Fiberglass  Post-procedure details:     Sensation:  Normal    Patient tolerance of procedure:  Tolerated well, no immediate complications  Comments:      Waterproof padding

## 2019-02-08 ENCOUNTER — OFFICE VISIT (OUTPATIENT)
Dept: ORTHOPEDICS | Facility: CLINIC | Age: 34
End: 2019-02-08
Payer: COMMERCIAL

## 2019-02-08 VITALS
WEIGHT: 170 LBS | SYSTOLIC BLOOD PRESSURE: 110 MMHG | HEIGHT: 71 IN | BODY MASS INDEX: 23.8 KG/M2 | DIASTOLIC BLOOD PRESSURE: 80 MMHG

## 2019-02-08 DIAGNOSIS — M22.2X2 PATELLOFEMORAL PAIN SYNDROME OF LEFT KNEE: ICD-10-CM

## 2019-02-08 DIAGNOSIS — M62.81 QUADRICEPS WEAKNESS: Primary | ICD-10-CM

## 2019-02-08 DIAGNOSIS — S62.024D CLOSED NONDISPLACED FRACTURE OF MIDDLE THIRD OF SCAPHOID OF RIGHT WRIST WITH ROUTINE HEALING, SUBSEQUENT ENCOUNTER: ICD-10-CM

## 2019-02-08 PROCEDURE — 99213 OFFICE O/P EST LOW 20 MIN: CPT | Mod: 24 | Performed by: FAMILY MEDICINE

## 2019-02-08 PROCEDURE — 99207 ZZC FRACTURE CARE IN GLOBAL PERIOD: CPT | Performed by: FAMILY MEDICINE

## 2019-02-08 PROCEDURE — 29075 APPL CST ELBW FNGR SHORT ARM: CPT | Mod: 58 | Performed by: FAMILY MEDICINE

## 2019-02-08 ASSESSMENT — MIFFLIN-ST. JEOR: SCORE: 1728.24

## 2019-02-08 NOTE — LETTER
2/8/2019         RE: Jl Isaac  [address]  [facility] [id number]        Dear Colleague,    Thank you for referring your patient, Jl Isaac, to the [facility]. Please see a copy of my visit note below.    HPI   [facility]   Follow-up Visit s Feb 8, 2019    PCP: No Ref-Primary, Physician      Subjective:  Jl is a 34 year old male who is seen in follow up for evaluation of   Chief Complaint   Patient presents with     Left Knee - Pain     Right Wrist - Pain     His last visit was on 2/5/2019.  Since that time, symptoms have been unchanged. Jl Isaac is accompanied today by self.     Patient has noticed worsened symptoms with splinting treatment.  Patient has marked swelling  Pain is located radial, wrist, constant and persistent.  Patient is using splint.      New left knee  Injury: Patient describes injury as soccer injury. He felt a pop in his knee and hyper extended his knee.      Location of Pain: left knee wrist radial and ulnar, nonradiating   Duration of Pain: 2 years(s)  Rating of Pain at worst: 2/10  Rating of Pain Currently: 0/10  Pain is better with: activity avoidance   Pain is worse with:sitting to standing, squat  Treatment so far consists of: physical therapy   Associated symptoms: atrophy of quad  Recent imaging completed: No recent imaging completed, xrays at [facility] 2017  Prior History of related problems: none    Patient's past medical, surgical, social and family histories are reviewed today.    doi wrist 2/4/19    Social History: is employed as a/an sales      Past Medical History:   Diagnosis Date     NO ACTIVE PROBLEMS        Patient Active Problem List    Diagnosis Date Noted     Closed nondisplaced fracture of middle third of scaphoid bone of right wrist, initial encounter 02/05/2019     Priority: Medium     FMHX denies sig illnesses.      Social History     Socioeconomic History      "Marital status:      Spouse name: Not on file     Number of children: Not on file     Years of education: Not on file     Highest education level: Not on file   Social Needs     Financial resource strain: Not on file     Food insecurity - worry: Not on file     Food insecurity - inability: Not on file     Transportation needs - medical: Not on file     Transportation needs - non-medical: Not on file   Occupational History     Not on file   Tobacco Use     Smoking status: Never Smoker   Substance and Sexual Activity     Alcohol use: No     Drug use: No       Past Surgical History:   Procedure Laterality Date     NO HISTORY OF SURGERY             Review of Systems   Musculoskeletal: Positive for joint pain.   All other systems reviewed and are negative.        Physical Exam  /80   Ht 1.803 m (5' 11\")   Wt 77.1 kg (170 lb)   BMI 23.71 kg/m     Constitutional:well-developed, well-nourished, and in no distress.   Cardiovascular: Intact distal pulses.    Neurological: alert. Gait Normal:   Gait, station, stance, and balance appear normal for age  Skin: Skin is warm and dry.   Psychiatric: Mood and affect normal.   Respiratory: unlabored, speaks in full sentences  Lymph: no LAD, no lymphangitis            Left Hand Exam     Tenderness   The patient is experiencing tenderness in the snuff box.     Range of Motion   Wrist   Extension: 20   Flexion: 20   Pronation:  0 normal   Supination: 0     Other   Erythema: absent  Scars: absent  Sensation: normal  Pulse: present    Comments:  Resolved swelling about the wrist.  Mild swelling of fingers.  Sensation intact, good circulation.          X-ray images Ordered and independently reviewed by me in the office today with the patient. X-ray shows:     Recent Results (from the past 744 hour(s))   XR Wrist Right G/E 3 Views    Narrative    2/5/2019    Nondisplaced mid waist lucency of scaphoid highly suspicious for fracture.    Remainder of wrist is normal, without " other fractures or soft tissue   deformities.         ASSESSMENT/PLAN    ICD-10-CM    1. Quadriceps weakness M62.81 [facility]   2. Patellofemoral pain syndrome of left knee M22.2X2 [facility]   3. Closed nondisplaced fracture of middle third of scaphoid of right wrist with routine healing, subsequent encounter S62.024D Cast/splint application     Converted to thumb spica short arm cast today, recheck with repeat x-rays in 3-4 weeks.    Left knee pain also evaluated today as above, and pain is most consistent with patellofemoral pain syndrome.  He has had a few therapy visits before but never a dedicated effort.  We discussed MRI but deferred for now.  Unless symptoms change, this is otherwise consistent with patellofemoral syndrome.  MRI would not be necessary.  Physical therapy orders placed.    Cast/splint application  Date/Time: 2/8/2019 3:05 PM  Performed by: Erickson Mirza MD  Authorized by: Erickson Mirza MD     Consent:     Consent obtained:  Verbal    Consent given by:  Patient  Pre-procedure details:     Sensation:  Normal  Procedure details:     Laterality:  Right    Location:  Wrist    Wrist:  R wrist    Cast type:  Thumb spica    Supplies:  Fiberglass  Post-procedure details:     Sensation:  Normal    Patient tolerance of procedure:  Tolerated well, no immediate complications  Comments:      Waterproof padding              Again, thank you for allowing me to participate in the care of your patient.        Sincerely,        Erickson Mirza MD

## 2019-02-21 NOTE — PROGRESS NOTES
[facility]   Clinic Visit s Mar 1, 2019    PCP: No Ref-Primary, Physician    Subjective:  Jl Isaac is a 34 year old male who is seen today for follow up of Closed nondisplaced fracture of middle third of scaphoid of right wrist with routine healing, subsequent encounter. Injury occurred on February / 04 / 2019, (4  week(s) ago); his last visit was 2/8/2019.  He has been in a thumb spica cast. Jl Isaac is alone today     Denies new swelling, paresthesias, or weakness.  Has not had any other concerns about the injury.    Patient's past medical, surgical, social and family histories are reviewed today in the medical record.    History from previous visit on 2/8/2019  His last visit was on 2/5/2019.  Since that time, symptoms have been unchanged. Jl Isaac is accompanied today by self.     Patient has noticed worsened symptoms with splinting treatment.  Patient has marked swelling  Pain is located radial, wrist, constant and persistent.  Patient is using splint.        doi wrist 2/4/19    Social History: is employed as a/an sales      Past Medical History:   Diagnosis Date     NO ACTIVE PROBLEMS        Patient Active Problem List    Diagnosis Date Noted     Quadriceps weakness 02/08/2019     Priority: Medium     Patellofemoral pain syndrome of left knee 02/08/2019     Priority: Medium     Closed nondisplaced fracture of middle third of scaphoid bone of right wrist, initial encounter 02/05/2019     Priority: Medium     FMHX denies sig illnesses.      Social History     Socioeconomic History     Marital status:                                                     Occupational History     Not on file   Tobacco Use     Smoking status: Never Smoker   Substance and Sexual Activity     Alcohol use: No     Drug use: No       Past Surgical History:   Procedure Laterality Date     NO HISTORY OF SURGERY             Review of Systems   Musculoskeletal: Positive  "for joint pain.   All other systems reviewed and are negative.        Physical Exam  /80   Ht 1.803 m (5' 11\")   Wt 77.1 kg (170 lb)   BMI 23.71 kg/m    Constitutional:well-developed, well-nourished, and in no distress.   Cardiovascular: Intact distal pulses.    Neurological: alert. Gait Normal:   Gait, station, stance, and balance appear normal for age  Skin: Skin is warm and dry.   Psychiatric: Mood and affect normal.   Respiratory: unlabored, speaks in full sentences  Lymph: no LAD, no lymphangitis            Left Hand Exam     Tenderness   The patient is experiencing tenderness in the snuff box.     Range of Motion   Wrist   Extension: 20   Flexion: 20   Pronation:  0 normal   Supination: 0     Other   Erythema: absent  Scars: absent  Sensation: normal  Pulse: present    Comments:  Resolved swelling. Sensation intact, good circulation.          X-ray images Ordered and independently reviewed by me in the office today with the patient. X-ray shows:     Recent Results (from the past 744 hour(s))   XR Wrist Right G/E 3 Views    Narrative    2/5/2019    Nondisplaced mid waist lucency of scaphoid highly suspicious for fracture.    Remainder of wrist is normal, without other fractures or soft tissue   deformities.   XR Wrist Right G/E 3 Views    Narrative    Mid-waist nondisplaced scaphoid fracture as previously noted, fracture   lines slightly more evident due to resorption, otherwise stable.         ASSESSMENT/PLAN    ICD-10-CM    1. Closed nondisplaced fracture of middle third of scaphoid of right wrist with routine healing, subsequent encounter S62.024D XR Wrist Right G/E 3 Views     Cast/splint application     CT Wrist Right w/o Contrast     Stable scaphoid fracture, with suggestion that fracture is incomplete on x-ray today.  We will continue thumb spica casting and recheck in 4 weeks time but prior to next visit, a CT scan will be obtained to verify bony healing.  Patient comfortable with plan.  Thumb " spica cast replaced.  Follow-up  4 weeks      Cast/splint application  Date/Time: 3/1/2019 3:11 PM  Performed by: Erickson Mirza MD  Authorized by: Erickson Mirza MD     Consent:     Consent obtained:  Verbal    Consent given by:  Patient  Pre-procedure details:     Sensation:  Normal  Procedure details:     Laterality:  Right    Location:  Wrist    Wrist:  R wrist    Cast type:  Thumb spica    Supplies:  Fiberglass  Post-procedure details:     Sensation:  Normal    Patient tolerance of procedure:  Tolerated well, no immediate complications  Comments:      Waterproof padding

## 2019-03-01 ENCOUNTER — OFFICE VISIT (OUTPATIENT)
Dept: ORTHOPEDICS | Facility: CLINIC | Age: 34
End: 2019-03-01
Payer: COMMERCIAL

## 2019-03-01 ENCOUNTER — ANCILLARY PROCEDURE (OUTPATIENT)
Dept: GENERAL RADIOLOGY | Facility: CLINIC | Age: 34
End: 2019-03-01
Attending: FAMILY MEDICINE
Payer: COMMERCIAL

## 2019-03-01 VITALS
HEIGHT: 71 IN | WEIGHT: 170 LBS | SYSTOLIC BLOOD PRESSURE: 110 MMHG | DIASTOLIC BLOOD PRESSURE: 80 MMHG | BODY MASS INDEX: 23.8 KG/M2

## 2019-03-01 DIAGNOSIS — S62.024D CLOSED NONDISPLACED FRACTURE OF MIDDLE THIRD OF SCAPHOID OF RIGHT WRIST WITH ROUTINE HEALING, SUBSEQUENT ENCOUNTER: Primary | ICD-10-CM

## 2019-03-01 PROCEDURE — 29075 APPL CST ELBW FNGR SHORT ARM: CPT | Mod: 58 | Performed by: FAMILY MEDICINE

## 2019-03-01 PROCEDURE — 99207 ZZC FRACTURE CARE IN GLOBAL PERIOD: CPT | Performed by: FAMILY MEDICINE

## 2019-03-01 PROCEDURE — 73110 X-RAY EXAM OF WRIST: CPT | Mod: RT | Performed by: FAMILY MEDICINE

## 2019-03-01 ASSESSMENT — MIFFLIN-ST. JEOR: SCORE: 1728.24

## 2019-03-01 NOTE — LETTER
3/1/2019         RE: Jl Isaac  [address]  [facility] [address]        Dear Colleague,    Thank you for referring your patient, Jl Isaac, to the [facility]. Please see a copy of my visit note below.    HPI   [facility]   Clinic Visit s Mar 1, 2019    PCP: No Ref-Primary, Physician    Subjective:  Jl Isaac is a 34 year old male who is seen today for follow up of Closed nondisplaced fracture of middle third of scaphoid of right wrist with routine healing, subsequent encounter. Injury occurred on February / 04 / 2019, (4  week(s) ago); his last visit was 2/8/2019.  He has been in a thumb spica cast. Jl Isaac is alone today     Denies new swelling, paresthesias, or weakness.  Has not had any other concerns about the injury.    Patient's past medical, surgical, social and family histories are reviewed today in the medical record.    History from previous visit on 2/8/2019  His last visit was on 2/5/2019.  Since that time, symptoms have been unchanged. Jl Isaac is accompanied today by self.     Patient has noticed worsened symptoms with splinting treatment.  Patient has marked swelling  Pain is located radial, wrist, constant and persistent.  Patient is using splint.        doi wrist 2/4/19    Social History: is employed as a/an sales      Past Medical History:   Diagnosis Date     NO ACTIVE PROBLEMS        Patient Active Problem List    Diagnosis Date Noted     Quadriceps weakness 02/08/2019     Priority: Medium     Patellofemoral pain syndrome of left knee 02/08/2019     Priority: Medium     Closed nondisplaced fracture of middle third of scaphoid bone of right wrist, initial encounter 02/05/2019     Priority: Medium     FMHX denies sig illnesses.      Social History     Socioeconomic History     Marital status:                                                     Occupational History  "    Not on file   Tobacco Use     Smoking status: Never Smoker   Substance and Sexual Activity     Alcohol use: No     Drug use: No       Past Surgical History:   Procedure Laterality Date     NO HISTORY OF SURGERY             Review of Systems   Musculoskeletal: Positive for joint pain.   All other systems reviewed and are negative.        Physical Exam  /80   Ht 1.803 m (5' 11\")   Wt 77.1 kg (170 lb)   BMI 23.71 kg/m     Constitutional:well-developed, well-nourished, and in no distress.   Cardiovascular: Intact distal pulses.    Neurological: alert. Gait Normal:   Gait, station, stance, and balance appear normal for age  Skin: Skin is warm and dry.   Psychiatric: Mood and affect normal.   Respiratory: unlabored, speaks in full sentences  Lymph: no LAD, no lymphangitis            Left Hand Exam     Tenderness   The patient is experiencing tenderness in the snuff box.     Range of Motion   Wrist   Extension: 20   Flexion: 20   Pronation:  0 normal   Supination: 0     Other   Erythema: absent  Scars: absent  Sensation: normal  Pulse: present    Comments:  Resolved swelling. Sensation intact, good circulation.          X-ray images Ordered and independently reviewed by me in the office today with the patient. X-ray shows:     Recent Results (from the past 744 hour(s))   XR Wrist Right G/E 3 Views    Narrative    2/5/2019    Nondisplaced mid waist lucency of scaphoid highly suspicious for fracture.    Remainder of wrist is normal, without other fractures or soft tissue   deformities.   XR Wrist Right G/E 3 Views    Narrative    Mid-waist nondisplaced scaphoid fracture as previously noted, fracture   lines slightly more evident due to resorption, otherwise stable.         ASSESSMENT/PLAN    ICD-10-CM    1. Closed nondisplaced fracture of middle third of scaphoid of right wrist with routine healing, subsequent encounter S62.024D XR Wrist Right G/E 3 Views     Cast/splint application     CT Wrist Right w/o " Contrast     Stable scaphoid fracture, with suggestion that fracture is incomplete on x-ray today.  We will continue thumb spica casting and recheck in 4 weeks time but prior to next visit, a CT scan will be obtained to verify bony healing.  Patient comfortable with plan.  Thumb spica cast replaced.  Follow-up  4 weeks      Cast/splint application  Date/Time: 3/1/2019 3:11 PM  Performed by: Erickson Mirza MD  Authorized by: Erickson Mirza MD     Consent:     Consent obtained:  Verbal    Consent given by:  Patient  Pre-procedure details:     Sensation:  Normal  Procedure details:     Laterality:  Right    Location:  Wrist    Wrist:  R wrist    Cast type:  Thumb spica    Supplies:  Fiberglass  Post-procedure details:     Sensation:  Normal    Patient tolerance of procedure:  Tolerated well, no immediate complications  Comments:      Waterproof padding          Again, thank you for allowing me to participate in the care of your patient.        Sincerely,        Erickson Mirza MD

## 2019-07-16 ENCOUNTER — OFFICE VISIT (OUTPATIENT)
Dept: INTERNAL MEDICINE | Facility: CLINIC | Age: 34
End: 2019-07-16
Payer: COMMERCIAL

## 2019-07-16 VITALS
SYSTOLIC BLOOD PRESSURE: 116 MMHG | DIASTOLIC BLOOD PRESSURE: 78 MMHG | OXYGEN SATURATION: 100 % | TEMPERATURE: 97.7 F | HEART RATE: 78 BPM | BODY MASS INDEX: 22.87 KG/M2 | WEIGHT: 164 LBS

## 2019-07-16 DIAGNOSIS — Z11.3 SCREEN FOR STD (SEXUALLY TRANSMITTED DISEASE): Primary | ICD-10-CM

## 2019-07-16 PROCEDURE — 87591 N.GONORRHOEAE DNA AMP PROB: CPT | Performed by: PHYSICIAN ASSISTANT

## 2019-07-16 PROCEDURE — 99203 OFFICE O/P NEW LOW 30 MIN: CPT | Performed by: PHYSICIAN ASSISTANT

## 2019-07-16 PROCEDURE — 36415 COLL VENOUS BLD VENIPUNCTURE: CPT | Performed by: PHYSICIAN ASSISTANT

## 2019-07-16 PROCEDURE — 87340 HEPATITIS B SURFACE AG IA: CPT | Performed by: PHYSICIAN ASSISTANT

## 2019-07-16 PROCEDURE — 87389 HIV-1 AG W/HIV-1&-2 AB AG IA: CPT | Performed by: PHYSICIAN ASSISTANT

## 2019-07-16 PROCEDURE — 87491 CHLMYD TRACH DNA AMP PROBE: CPT | Performed by: PHYSICIAN ASSISTANT

## 2019-07-16 PROCEDURE — 86803 HEPATITIS C AB TEST: CPT | Performed by: PHYSICIAN ASSISTANT

## 2019-07-16 PROCEDURE — 86780 TREPONEMA PALLIDUM: CPT | Performed by: PHYSICIAN ASSISTANT

## 2019-07-16 NOTE — PROGRESS NOTES
Subjective     Jl Isaac is a 34 year old male who presents to clinic today for the following health issues:    HPI     STD screening-patient and wife trying to conceive.  Wife is undergoing some fertility testing and he is needs to have STD screening done.  He will be having semen analysis done as well later this week.     -------------------------------------        Reviewed and updated as needed this visit by Provider  Tobacco  Allergies  Meds  Problems         Review of Systems   ROS COMP: Constitutional, HEENT, cardiovascular, pulmonary, gi and gu systems are negative, except as otherwise noted.      Objective    /78   Pulse 78   Temp 97.7  F (36.5  C) (Oral)   Wt 74.4 kg (164 lb)   SpO2 100%   BMI 22.87 kg/m    Body mass index is 22.87 kg/m .  Physical Exam   GENERAL: healthy, alert and no distress  RESP: lungs clear to auscultation - no rales, rhonchi or wheezes  CV: regular rate and rhythm, normal S1 S2, no S3 or S4, no murmur, click or rub, no peripheral edema and peripheral pulses strong  MS: no gross musculoskeletal defects noted, no edema  SKIN: no suspicious lesions or rashes    Diagnostic Test Results:  Pending         Assessment & Plan     1. Screen for STD (sexually transmitted disease)    - Chlamydia trachomatis PCR  - Neisseria gonorrhoeae PCR  - HIV Antigen Antibody Combo  - Treponema Abs w Reflex to RPR and Titer  - Hepatitis C antibody       Will notify of results when available       Return in about 3 months (around 10/16/2019) for regular primary provider establish care.    Rebeca Inman PA-C  [facility]

## 2019-07-17 ENCOUNTER — MYC MEDICAL ADVICE (OUTPATIENT)
Dept: INTERNAL MEDICINE | Facility: CLINIC | Age: 34
End: 2019-07-17
Payer: COMMERCIAL

## 2019-07-17 DIAGNOSIS — Z11.3 SCREEN FOR STD (SEXUALLY TRANSMITTED DISEASE): Primary | ICD-10-CM

## 2019-07-17 LAB
C TRACH DNA SPEC QL NAA+PROBE: NEGATIVE
HBV SURFACE AG SERPL QL IA: NONREACTIVE
HCV AB SERPL QL IA: NONREACTIVE
HIV 1+2 AB+HIV1 P24 AG SERPL QL IA: NONREACTIVE
N GONORRHOEA DNA SPEC QL NAA+PROBE: NEGATIVE
SPECIMEN SOURCE: NORMAL
SPECIMEN SOURCE: NORMAL
T PALLIDUM AB SER QL: NONREACTIVE

## 2019-11-07 ENCOUNTER — HEALTH MAINTENANCE LETTER (OUTPATIENT)
Age: 34
End: 2019-11-07

## 2020-11-29 ENCOUNTER — HEALTH MAINTENANCE LETTER (OUTPATIENT)
Age: 35
End: 2020-11-29

## 2021-05-05 ENCOUNTER — TELEPHONE (OUTPATIENT)
Dept: ORTHOPEDICS | Facility: CLINIC | Age: 36
End: 2021-05-05

## 2021-05-05 NOTE — TELEPHONE ENCOUNTER
[facility] Call Center    Phone Message    May a detailed message be left on voicemail: yes     Reason for Call: Appointment Intake    Referring Provider Name: self refer   Diagnosis and/or Symptoms: Possible Fractured L Ulna -- was playing basketball yesterday and landed on wrist (Spouse says he fractured R wrist 1.5 years ago and now possibly the L)  There is swelling // tenderness // Cannot move arm     Action Taken: Message routed to:  [facility] sports // walk in     Travel Screening: Not Applicable     Please call patients wife first // says if she does not answer please call  and leave a message if needed of possible times today or what we can do     Wife says Patient does not want to go to Urgent care because he does not have insurance

## 2021-05-05 NOTE — TELEPHONE ENCOUNTER
[facility] not have [facility]icemailbox that is set up.    I let them know they can call back the  number and we can help him find a time to be seen this afternoon for his injury.     - Tani Li, ATC

## 2021-08-19 DIAGNOSIS — Z84.89 FAMILY HISTORY OF GENETIC DISEASE: Primary | ICD-10-CM

## 2021-09-25 ENCOUNTER — HEALTH MAINTENANCE LETTER (OUTPATIENT)
Age: 36
End: 2021-09-25

## 2021-10-27 ENCOUNTER — HOSPITAL ENCOUNTER (EMERGENCY)
Facility: CLINIC | Age: 36
Discharge: HOME | End: 2021-10-27
Attending: EMERGENCY MEDICINE | Admitting: EMERGENCY MEDICINE

## 2021-10-27 VITALS
RESPIRATION RATE: 18 BRPM | BODY MASS INDEX: 25.06 KG/M2 | DIASTOLIC BLOOD PRESSURE: 74 MMHG | TEMPERATURE: 98.2 F | OXYGEN SATURATION: 100 % | HEIGHT: 71 IN | SYSTOLIC BLOOD PRESSURE: 134 MMHG | WEIGHT: 179 LBS | HEART RATE: 80 BPM

## 2021-10-27 DIAGNOSIS — F41.1 ANXIETY REACTION: ICD-10-CM

## 2021-10-27 LAB
ATRIAL RATE - MUSE: 82 BPM
DIASTOLIC BLOOD PRESSURE - MUSE: NORMAL MMHG
FLUAV RNA SPEC QL NAA+PROBE: NEGATIVE
FLUBV RNA RESP QL NAA+PROBE: NEGATIVE
INTERPRETATION ECG - MUSE: NORMAL
P AXIS - MUSE: 61 DEGREES
PR INTERVAL - MUSE: 162 MS
QRS DURATION - MUSE: 84 MS
QT - MUSE: 376 MS
QTC - MUSE: 439 MS
R AXIS - MUSE: 32 DEGREES
RSV RNA SPEC NAA+PROBE: NEGATIVE
SARS-COV-2 RNA RESP QL NAA+PROBE: NEGATIVE
SYSTOLIC BLOOD PRESSURE - MUSE: NORMAL MMHG
T AXIS - MUSE: 8 DEGREES
VENTRICULAR RATE- MUSE: 82 BPM

## 2021-10-27 PROCEDURE — 93005 ELECTROCARDIOGRAM TRACING: CPT

## 2021-10-27 PROCEDURE — 87637 SARSCOV2&INF A&B&RSV AMP PRB: CPT | Performed by: EMERGENCY MEDICINE

## 2021-10-27 PROCEDURE — 99284 EMERGENCY DEPT VISIT MOD MDM: CPT

## 2021-10-27 PROCEDURE — C9803 HOPD COVID-19 SPEC COLLECT: HCPCS

## 2021-10-27 PROCEDURE — 99284 EMERGENCY DEPT VISIT MOD MDM: CPT | Mod: 25

## 2021-10-27 RX ORDER — PHENTOLAMINE MESYLATE 5 MG/1
2 INJECTION INTRAMUSCULAR; INTRAVENOUS ONCE
Status: DISCONTINUED | OUTPATIENT
Start: 2021-10-27 | End: 2021-10-27

## 2021-10-27 ASSESSMENT — ENCOUNTER SYMPTOMS
NERVOUS/ANXIOUS: 1
FEVER: 0
SHORTNESS OF BREATH: 1
PALPITATIONS: 0
FATIGUE: 0
VOMITING: 0
NAUSEA: 0

## 2021-10-27 ASSESSMENT — MIFFLIN-ST. JEOR: SCORE: 1759.07

## 2021-10-27 NOTE — ED PROVIDER NOTES
"  History   Chief Complaint:  Shortness of Breath       The history is provided by the patient.      Jl Isaac is a 36 year old male who presents with shortness of breath. Patient reports having flu like symptoms. He reports having an elevated temperature of 99 as well over the weekend. Since then he has had congestion. Last night he played soccer and was feeling fine immediately after. Around 0420 this morning, however, he started experiencing shortness of breath. Here at the ED he states that he is feeling rather anxious and feels that his anxiety has caused his symptoms. No chest pain, nausea, or vomiting. No fever, palpitations, dyspnea, abdominal pain or other symptoms.  He reports concerns for COVID as he is not yet vaccinated and was supposed to be. Right now he does not have any symptoms. No recent sick contacts. He is a hookah smoker.     Review of Systems   Constitutional: Negative for fatigue and fever.   Respiratory: Positive for shortness of breath (resolved).    Cardiovascular: Negative for palpitations.   Gastrointestinal: Negative for nausea and vomiting.   Psychiatric/Behavioral: The patient is nervous/anxious.    All other systems reviewed and are negative.      Allergies:  No Known Allergies    Medications:  The patient is currently on no regular medications.      Past Medical History:     Closed nondisplaced fracture of middle third of scaphoid bone of right wrist, initial encounter  Quadriceps weakness  Patellofemoral pain syndrome of left knee    Past Surgical History:    The patient does not have any pertinent past surgical history.    Social History:  Patient unaccompanied  [facility]  Marital status:     Physical Exam     Patient Vitals for the past 24 hrs:   BP Temp Temp src Pulse Resp SpO2 Height Weight   10/27/21 0427 133/82 98.2  F (36.8  C) Temporal 71 16 100 % 1.803 m (5' 11\") 81.2 kg (179 lb)       Physical Exam  Nursing note and vitals " reviewed.  Constitutional: Well nourished. Resting comfortably.   Eyes: Conjunctiva normal.  Pupils are equal, round, and reactive to light.   ENT: Nose normal. Mucous membranes pink and moist.    Neck: Normal range of motion.  CVS: Normal rate, regular rhythm.  Normal heart sounds.  No murmur.  Pulmonary: Lungs clear to auscultation bilaterally. No wheezes/rales/rhonchi.  GI: Abdomen soft. Nontender, nondistended. No rigidity or guarding.    MSK: No calf tenderness or swelling.  Neuro: Alert. Follows simple commands.  Skin: Skin is warm and dry. No rash noted.   Psychiatric: Normal affect.       Emergency Department Course   ECG  ECG obtained at 0442, ECG read at 0442  Normal sinus rhythm. Nonspecific T wave abnormality. Abnormal ECG.   No significant change as compared to prior, dated 11/25/2017.  Rate 82 bpm. SD interval 162 ms. QRS duration 84 ms. QT/QTc 376/439 ms. P-R-T axes 61 32 8.     Laboratory:  Symptomatic COVID 19: pending    POC glucose:     Emergency Department Course:  Reviewed:  I reviewed nursing notes, vitals, past medical history and Care Everywhere    Assessments/Consults  ED Course as of Oct 27 0500   Wed Oct 27, 2021   0440 Obtained history and examined the patient as noted above          Disposition:  The patient was discharged to home.     Impression & Plan     Medical Decision Making:  Patient is a 35 yo male presenting with sporadic dyspnea and anxiety prior to arrival.  EKG with nonspecific T wave inversions though similar to prior EKG.  He denies any chest pain.  I do not feel emergent labs are needed a this time.  Lungs clear, no significant work of breathing to necessitate CXR.  I doubt PE.  Patient was tested for COVID 19 as requested.  Result pending at dispo.  Strong suspicion for more anxiety reaction at this time.  I offered patient anxioysis though he declines.  He is not psychotic appearing. Patient counseled on recommendation to obtain COVID 19 vaccination as soon as possible.   Patient tested prior to dispo, results pending.  Monitor for fever, increased work of brr    Diagnosis:    ICD-10-CM    1. Anxiety reaction  F41.1        Discharge Medications:  New Prescriptions    No medications on file       Scribe Disclosure:  I, Peter Costa, am serving as a scribe at 4:39 AM on 10/27/2021 to document services personally performed by Lore Ansari DO based on my observations and the provider's statements to me.          Lore Ansari,   10/27/21 0838

## 2021-10-27 NOTE — ED TRIAGE NOTES
Patient presents with complaints of shortness of breath that began about 20 minutes prior to arrival. Patient stated that he played soccer last evening and he felt fine at that time. He also stated that he felt like he had the flu over the weekend but since then he has only has some nasal congestion. Patient added that he was worried and sometimes has a little anxiety, and right now he does not feel short of breath.

## 2021-12-21 ENCOUNTER — TELEPHONE (OUTPATIENT)
Dept: MATERNAL FETAL MEDICINE | Facility: CLINIC | Age: 36
End: 2021-12-21

## 2021-12-21 DIAGNOSIS — Z84.89 FAMILY HISTORY OF GENETIC DISEASE: Primary | ICD-10-CM

## 2021-12-21 NOTE — TELEPHONE ENCOUNTER
December 21, 2021    I attempted to call Jl at the request of his partner, Shahram, to discuss carrier screening. Unfortunately, he was unable to be reached and his voicemail was not set up.     Laureen Camarena MS, [facility]  Licensed Genetic Counselor  [facility]  Maternal Fetal Medicine  [email or web address]  [phone]

## 2021-12-22 NOTE — TELEPHONE ENCOUNTER
December 22, 2021    I spoke with Katelyn briefly. He was at work, but let me know that he would call me back before the end of the day to coordinate carrier screening.     Laureen Camarena MS, [facility]  Licensed Genetic Counselor  [facility]  Maternal Fetal Medicine  [email or web address]  [phone]

## 2022-01-15 ENCOUNTER — HEALTH MAINTENANCE LETTER (OUTPATIENT)
Age: 37
End: 2022-01-15

## 2022-01-19 DIAGNOSIS — Z84.89 FAMILY HISTORY OF GENETIC DISEASE: Primary | ICD-10-CM

## 2022-01-20 ENCOUNTER — APPOINTMENT (OUTPATIENT)
Dept: LAB | Facility: CLINIC | Age: 37
End: 2022-01-20

## 2022-01-20 DIAGNOSIS — Z31.448 ENCOUNTER FOR OTHER GENETIC TESTING OF MALE FOR PROCREATIVE MANAGEMENT: ICD-10-CM

## 2022-01-20 DIAGNOSIS — Z84.81 FAMILY HISTORY OF GENETIC DISEASE CARRIER: ICD-10-CM

## 2022-01-20 DIAGNOSIS — Z31.448 ENCOUNTER FOR OTHER GENETIC TESTING OF MALE FOR PROCREATIVE MANAGEMENT: Primary | ICD-10-CM

## 2022-01-20 PROCEDURE — 36415 COLL VENOUS BLD VENIPUNCTURE: CPT

## 2022-01-20 NOTE — PROGRESS NOTES
Invitae and paternal blood collected in clinic and sent to lab. Patient tolerated well.    Malini Rosas RN

## 2022-01-31 LAB
Lab: 100
PERFORMING LABORATORY: ABNORMAL
SCANNED LAB RESULT: ABNORMAL
SPECIMEN STATUS: ABNORMAL
TEST NAME: ABNORMAL

## 2022-02-13 LAB — SCANNED LAB RESULT: ABNORMAL

## 2022-12-26 ENCOUNTER — HEALTH MAINTENANCE LETTER (OUTPATIENT)
Age: 37
End: 2022-12-26

## 2023-04-22 ENCOUNTER — HEALTH MAINTENANCE LETTER (OUTPATIENT)
Age: 38
End: 2023-04-22

## 2024-06-23 ENCOUNTER — HEALTH MAINTENANCE LETTER (OUTPATIENT)
Age: 39
End: 2024-06-23